# Patient Record
Sex: MALE | Race: WHITE
[De-identification: names, ages, dates, MRNs, and addresses within clinical notes are randomized per-mention and may not be internally consistent; named-entity substitution may affect disease eponyms.]

---

## 2020-01-31 ENCOUNTER — HOSPITAL ENCOUNTER (OUTPATIENT)
Dept: HOSPITAL 59 - SUR | Age: 80
Discharge: HOME | End: 2020-01-31
Attending: ORTHOPAEDIC SURGERY
Payer: MEDICARE

## 2020-01-31 DIAGNOSIS — Z95.828: ICD-10-CM

## 2020-01-31 DIAGNOSIS — M67.51: ICD-10-CM

## 2020-01-31 DIAGNOSIS — Z79.02: ICD-10-CM

## 2020-01-31 DIAGNOSIS — M23.251: Primary | ICD-10-CM

## 2020-01-31 DIAGNOSIS — M23.261: ICD-10-CM

## 2020-01-31 DIAGNOSIS — M22.41: ICD-10-CM

## 2020-01-31 DIAGNOSIS — I10: ICD-10-CM

## 2020-01-31 NOTE — OPERATIVE NOTE
DATE OF SURGERY:  01/31/2020



SURGEON:  Jasbir Mcgrath D.O.



REFERRING PHYSICIAN:  Erich Simeon M.D.



PREOPERATIVE DIAGNOSIS:

1.  TORN MEDIAL AND LATERAL MENISCUS OF THE RIGHT KNEE.  

2.  CHONDROMALACIA OF THE RIGHT KNEE.  



POSTOPERATIVE DIAGNOSIS:

1.  TORN MEDIAL AND LATERAL MENISCUS OF THE RIGHT KNEE.  

2.  CHONDROMALACIA OF THE MEDIAL FEMORAL CONDYLE, PATELLA, AND TROCHLEA RIGHT 
KNEE.  

3.  MEDIAL MID PATELLA PLICA RIGHT KNEE.  



OPERATION:

1.  ARTHROSCOPIC PARTIAL MEDIAL AND LATERAL MENISCECTOMY RIGHT KNEE.  

2.  ARTHROSCOPIC RESECTION MEDIAL MID PATELLA PLICA RIGHT KNEE.  

3.  ARTHROSCOPIC CHONDROPLASTY MEDIAL FEMORAL CONDYLE RIGHT KNEE.  



DESCRIPTION:  This 79-year-old male was taken to the Operating Room and placed 
in the supine position on the operating room table.  General anesthetic was 
administered.  The right lower extremity was elevated, exsanguinated, and 
tourniquet inflated to 250 mmHg.  Arthroscopic knee shafer applied.  The right 
knee was prepped with Hibiclens and draped in the usual sterile fashion.  



An inferolateral portal was established for the 4 mm arthroscope and initial 
evaluation of the joint demonstrated mild Grade 2 chondromalacia of the patella 
and minimal scuffing of the trochlea was present, but no instability was 
identified with probing and it was not further disturbed.  There was a medial 
mid patella plica, however, which was resected with the rotating shaver.  



The medial compartment was entered and cartilaginous debris was as suctioned 
from the joint.  The medial compartment showed a complex tear of the posterior 
horn of the medial meniscus.  There was advanced degenerative disease of the 
medial femoral condyle with Grade 3 changes being present.  Arthroscopic shaver 
was used to resect down stable fragments of articular cartilage.  The meniscus 
had both flap and horizontal cleavage components with the apex of the tear being
at approximately the 12:30 position.  Utilizing the basket forceps we resected 
back to the apex of the horizontal cleavage component and then tapered in each 
direction removing the flap at about the 2:00 o'clock position.  This was then 
reprobed and confirmed to be stable.  



The intercondylar notch was examined and found to be normal.  



The lateral compartment was entered and a tear at the posterior root of the 
lateral meniscus was present, the root, however, was still intact, but marked 
fraying of the posterior horn was present and this was resected with the 
rotating shaver.  The articular cartilage lateral compartment appear to be 
essentially normal.  



The joint was then copiously irrigated and suctioned and all areas were re-
examined with no additional findings being present.  The instruments were 
removed, the portals infiltrated with 0.25% Marcaine with Epinephrine, sterile 
dressings applied, tourniquet and knee shafer released and the patient taken to 
the Recovery Room in satisfactory condition.  



GROSS PATHOLOGY:  This patient demonstrated Grade 2 change in the medial femoral
condyle, mild Grade 2 change of the patella articular cartilage.  There was also
a complex tear of the posterior horn of the medial meniscus and a tear of the 
posterior horn of the lateral meniscus as described above.  



JOB NUMBER:  [id number]

[facility]